# Patient Record
Sex: MALE | Race: WHITE | NOT HISPANIC OR LATINO
[De-identification: names, ages, dates, MRNs, and addresses within clinical notes are randomized per-mention and may not be internally consistent; named-entity substitution may affect disease eponyms.]

---

## 2019-07-02 ENCOUNTER — APPOINTMENT (OUTPATIENT)
Dept: ORTHOPEDIC SURGERY | Facility: CLINIC | Age: 69
End: 2019-07-02
Payer: MEDICARE

## 2019-07-02 VITALS
BODY MASS INDEX: 26.92 KG/M2 | HEART RATE: 57 BPM | HEIGHT: 70 IN | DIASTOLIC BLOOD PRESSURE: 83 MMHG | WEIGHT: 188 LBS | SYSTOLIC BLOOD PRESSURE: 162 MMHG

## 2019-07-02 PROCEDURE — 20610 DRAIN/INJ JOINT/BURSA W/O US: CPT | Mod: LT

## 2019-07-02 PROCEDURE — 99203 OFFICE O/P NEW LOW 30 MIN: CPT | Mod: 25

## 2019-07-02 PROCEDURE — 73030 X-RAY EXAM OF SHOULDER: CPT | Mod: LT

## 2019-07-02 NOTE — HISTORY OF PRESENT ILLNESS
[de-identified] : 67yo male presents complaining of left anterior shoulder pain and popping for 3-4 months. He states he dislocated his shoulder 18 years ago, treated with rest. He feels a clicking/popping sensation anterior lateral aspect shoulder. This occurs when reaching across body and sometimes overhead. Initially this did not cause pain but is now becoming sore. No injuries recently. He is using biofreeze and muscle rubs for pain control. Denies numbness tingling \par \par The patient's past medical history, past surgical history, medications, allergies, and social history were reviewed by me today with the patient and documented accordingly. In addition, the patient's family history, which is noncontributory to this visit, was also reviewed.\par

## 2019-07-02 NOTE — DISCUSSION/SUMMARY
[de-identified] : 68-year-old male left shoulder biceps tendinitis. We discussed diagnostic treatment options at length. He requested injection today\par \par Injection: Left shoulder biceps sheath\par Indication: Biceps tendinitis\par A discussion was had with the patient regarding this procedure and all questions were answered. All risks, benefits and alternatives were discussed. These included but were not limited to bleeding, infection, and allergic reaction. Alcohol was used to clean the skin, and betadine was used to sterilize and prep the area in the anterior aspect of the left shoulder. Ethyl chloride spray was then used as a topical anesthetic. A 21-gauge needle was used to inject 4cc of 1% lidocaine and 1cc of 40mg/ml methylprednisolone into the left biceps sheath. A sterile bandage was then applied. The patient tolerated the procedure well and there were no complications. \par \par Home exercise program anti-inflammatory medicines followup as needed. Not improved consider MRI. All questions answered

## 2019-07-02 NOTE — PHYSICAL EXAM
[de-identified] : \par The following radiographs were ordered and read by me during this patients visit. I reviewed each radiograph in detail with the patient and discussed the findings as highlighted below. \par \par 3 views left shoulder] were obtained today that show no fracture, dislocation. There is no degenerative change seen. There is no malalignment. No obvious osseous abnormality. Otherwise unremarkable.\par \par  [de-identified] : General Exam\par \par Well developed, well nourished\par No apparent distress\par Oriented to person, place, and time\par Mood: Normal\par Affect: Normal\par Balance and coordination: Normal\par Gait: Normal\par \par Left shoulder exam\par \par Inspection: No swelling, ecchymosis or gross deformity.\par Skin: No masses, No lesions\par Neck: Negative Spurlings, full ROM without pain\par Tenderness: + bicipital tenderness, no tenderness to the greater tuberosity/RTC insertion, no anterior shoulder/lesser tuberosity tenderness. No tenderness SC joint, clavicle, AC joint.\par ROM: 160/60/T6\par Impingement tests: neg Naylor\par AC Joint: no pain with cross arm testing\par Biceps: pos speed\par Strength: 5/5 abduction, external rotation, and internal rotation\par Neuro: AIN, PIN, Ulnar nerve motor intact\par Sensation: Intact to light touch in radial, median, ulnar, and axillary nerve distributions\par Vasc: 2+ radial pulse\par

## 2021-01-12 ENCOUNTER — TRANSCRIPTION ENCOUNTER (OUTPATIENT)
Age: 71
End: 2021-01-12

## 2021-04-20 ENCOUNTER — APPOINTMENT (OUTPATIENT)
Dept: ORTHOPEDIC SURGERY | Facility: CLINIC | Age: 71
End: 2021-04-20
Payer: MEDICARE

## 2021-04-20 ENCOUNTER — NON-APPOINTMENT (OUTPATIENT)
Age: 71
End: 2021-04-20

## 2021-04-20 PROCEDURE — 20610 DRAIN/INJ JOINT/BURSA W/O US: CPT | Mod: LT

## 2021-04-20 PROCEDURE — 99072 ADDL SUPL MATRL&STAF TM PHE: CPT

## 2021-04-20 PROCEDURE — 99213 OFFICE O/P EST LOW 20 MIN: CPT | Mod: 25

## 2021-04-20 NOTE — HISTORY OF PRESENT ILLNESS
[de-identified] : 67yo male presents complaining of left anterior shoulder pain and popping for 3-4 months. He states he dislocated his shoulder 18 years ago, treated with rest. He feels a clicking/popping sensation anterior lateral aspect shoulder. This occurs when reaching across body and sometimes overhead. Initially this did not cause pain but is now becoming sore. No injuries recently. He is using biofreeze and muscle rubs for pain control. Denies numbness tingling.\par Underwent cortisone injection 1.5 yrs ago with significant relief, requesting another one today

## 2021-04-20 NOTE — PHYSICAL EXAM
[de-identified] : Left shoulder exam\par \par Inspection: No swelling, ecchymosis or gross deformity.\par Skin: No masses, No lesions\par Neck: Negative Spurlings, full ROM without pain\par Tenderness: + bicipital tenderness, no tenderness to the greater tuberosity/RTC insertion, no anterior shoulder/lesser tuberosity tenderness. No tenderness SC joint, clavicle, AC joint.\par ROM: 160/60/T6\par Impingement tests: neg Naylor\par AC Joint: no pain with cross arm testing\par Biceps: pos speed\par Strength: 5/5 abduction, external rotation, and internal rotation\par Neuro: AIN, PIN, Ulnar nerve motor intact\par Sensation: Intact to light touch in radial, median, ulnar, and axillary nerve distributions\par Vasc: 2+ radial pulse

## 2021-04-20 NOTE — DISCUSSION/SUMMARY
[de-identified] : 70-year-old male left shoulder biceps tendinitis. We discussed diagnostic treatment options at length. He requested injection today\par \par Injection: Left shoulder biceps sheath\par Indication: Biceps tendinitis\par A discussion was had with the patient regarding this procedure and all questions were answered. All risks, benefits and alternatives were discussed. These included but were not limited to bleeding, infection, and allergic reaction. Alcohol was used to clean the skin, and betadine was used to sterilize and prep the area in the anterior aspect of the left shoulder. Ethyl chloride spray was then used as a topical anesthetic. A 21-gauge needle was used to inject 4cc of 1% lidocaine and 1cc of 40mg/ml methylprednisolone into the left biceps sheath. A sterile bandage was then applied. The patient tolerated the procedure well and there were no complications. \par \par Home exercise program anti-inflammatory medicines followup as needed. Not improved consider MRI. All questions answered.

## 2022-11-01 ENCOUNTER — APPOINTMENT (OUTPATIENT)
Dept: ORTHOPEDIC SURGERY | Facility: CLINIC | Age: 72
End: 2022-11-01

## 2022-11-01 DIAGNOSIS — M75.22 BICIPITAL TENDINITIS, LEFT SHOULDER: ICD-10-CM

## 2022-11-01 PROCEDURE — 99214 OFFICE O/P EST MOD 30 MIN: CPT | Mod: 25

## 2022-11-01 PROCEDURE — 20610 DRAIN/INJ JOINT/BURSA W/O US: CPT | Mod: LT

## 2022-11-01 NOTE — DISCUSSION/SUMMARY
[de-identified] : Injection: Left shoulder biceps sheath\par Indication: Biceps tendinitis\par \par A discussion was had with the patient regarding this procedure and all questions were answered. All risks, benefits and alternatives were discussed. These included but were not limited to bleeding, infection, and allergic reaction. Alcohol was used to clean the skin, and betadine was used to sterilize and prep the area in the anterior aspect of the left shoulder. Ethyl chloride spray was then used as a topical anesthetic. A 21-gauge needle was used to inject 4cc of 1% lidocaine and 1cc of 40mg/ml methylprednisolone into the left biceps sheath. A sterile bandage was then applied. The patient tolerated the procedure well and there were no complications. \par \par He presented today with acute worsening of pain.  requested inj.  consider MRI if not improving. fu as needed

## 2022-11-01 NOTE — HISTORY OF PRESENT ILLNESS
[de-identified] : 72yo male presents complaining of left anterior shoulder pain and popping for 3-4 months. He states he dislocated his shoulder 20+ years ago, treated with rest. He feels a clicking/popping sensation anterior lateral aspect shoulder. This occurs when reaching across body and sometimes overhead. Initially this did not cause pain but is now becoming sore. No injuries recently. He is using biofreeze and muscle rubs for pain control. Denies numbness tingling.\par Underwent cortisone injection 1.5 yrs ago with significant relief, requesting another one today

## 2022-11-01 NOTE — PHYSICAL EXAM
[de-identified] : Left shoulder exam\par \par Inspection: No swelling, ecchymosis or gross deformity.\par Skin: No masses, No lesions\par Neck: Negative Spurlings, full ROM without pain\par Tenderness: + bicipital tenderness, no tenderness to the greater tuberosity/RTC insertion, no anterior shoulder/lesser tuberosity tenderness. No tenderness SC joint, clavicle, AC joint.\par ROM: 160/60/T6\par Impingement tests: neg Naylor\par AC Joint: no pain with cross arm testing\par Biceps: pos speed\par Strength: 5/5 abduction, external rotation, and internal rotation\par Neuro: AIN, PIN, Ulnar nerve motor intact\par Sensation: Intact to light touch in radial, median, ulnar, and axillary nerve distributions\par Vasc: 2+ radial pulse